# Patient Record
Sex: FEMALE | Race: WHITE | URBAN - METROPOLITAN AREA
[De-identification: names, ages, dates, MRNs, and addresses within clinical notes are randomized per-mention and may not be internally consistent; named-entity substitution may affect disease eponyms.]

---
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
## 2025-06-19 ENCOUNTER — HOSPITAL ENCOUNTER (EMERGENCY)
Facility: CLINIC | Age: 25
Discharge: HOME | End: 2025-06-20
Attending: EMERGENCY MEDICINE | Admitting: EMERGENCY MEDICINE
Payer: COMMERCIAL

## 2025-06-19 VITALS
WEIGHT: 158.07 LBS | HEIGHT: 64 IN | DIASTOLIC BLOOD PRESSURE: 71 MMHG | TEMPERATURE: 97.9 F | SYSTOLIC BLOOD PRESSURE: 117 MMHG | OXYGEN SATURATION: 99 % | BODY MASS INDEX: 26.99 KG/M2 | HEART RATE: 69 BPM | RESPIRATION RATE: 20 BRPM

## 2025-06-19 DIAGNOSIS — H60.332 ACUTE SWIMMER'S EAR OF LEFT SIDE: ICD-10-CM

## 2025-06-19 PROCEDURE — 99284 EMERGENCY DEPT VISIT MOD MDM: CPT

## 2025-06-19 ASSESSMENT — COLUMBIA-SUICIDE SEVERITY RATING SCALE - C-SSRS
6. HAVE YOU EVER DONE ANYTHING, STARTED TO DO ANYTHING, OR PREPARED TO DO ANYTHING TO END YOUR LIFE?: NO
2. HAVE YOU ACTUALLY HAD ANY THOUGHTS OF KILLING YOURSELF IN THE PAST MONTH?: NO
1. IN THE PAST MONTH, HAVE YOU WISHED YOU WERE DEAD OR WISHED YOU COULD GO TO SLEEP AND NOT WAKE UP?: NO

## 2025-06-20 RX ORDER — OFLOXACIN 3 MG/ML
5 SOLUTION AURICULAR (OTIC) 2 TIMES DAILY
Qty: 5 ML | Refills: 0 | Status: SHIPPED | OUTPATIENT
Start: 2025-06-20 | End: 2025-06-27

## 2025-06-20 NOTE — ED PROVIDER NOTES
"  Emergency Department Note      History of Present Illness     Chief Complaint   Otalgia    HPI   Elyse Charlette Alba is a 24 year old female who presents with left ear pain for approximately two weeks that worsened 3 days ago. Patient reports she had a blackhead in her ear she picked off 2 weeks ago. Three days ago she woke up from sleep with sharp shooting pain in her left ear. She reports finding blood on her pillow and now has pain when opening her jaw. She denies fever, chills, recent cough, cold or illness.     Independent Historian   None    Review of External Notes   Care everywhere reviewed and epic updated    Past Medical History     Medical History and Problem List   Past Medical History:   Diagnosis Date    Hypothyroidism      Medications   None     Surgical History   Past Surgical History:   Procedure Laterality Date    NO HISTORY OF SURGERY       Physical Exam     Patient Vitals for the past 24 hrs:   BP Temp Temp src Pulse Resp SpO2 Height Weight   06/19/25 2302 117/71 97.9  F (36.6  C) Temporal 69 20 99 % 1.626 m (5' 4\") 71.7 kg (158 lb 1.1 oz)     Physical Exam  Nursing note and vitals reviewed.  Constitutional: Cooperative.  Sitting up comfortably  HENT:   Mouth/Throat: Mucous membranes are normal.  Normal jaw opening and occlusion  Left ear: No mastoid tenderness.  External auditory canal is diffusely edematous and tender.  Unable to fully visualize the TM due to occlusion.  Pulmonary/Chest: Effort normal.   Neurological: Alert.   Skin: Skin is warm and dry.   Psychiatric: Normal mood and affect.     Diagnostics     Lab Results   Labs Ordered and Resulted from Time of ED Arrival to Time of ED Departure - No data to display    ED Course      Medications Administered   Medications - No data to display    Discussion of Management   None    ED Course   ED Course as of 06/20/25 0007   Fri Jun 20, 2025   0000 I obtained history and examined the patient as noted above.     0005 I applied an ear wick on " the patient and explained discharge instructions. She is in agreement with plan.     Additional Documentation  None    Medical Decision Making / Diagnosis     VERONICA Alba is a 24 year old female who presents for evaluation of otalgia on the left side.  The patient has an exam consistent with otitis externa.  Differential considered in this patient with otalgia included mastoiditis, meningitis, perforation, cerumen impaction, mass, dental abscess, or peritonsillar abscess, referred pain, cholesteatoma, otitis externa, etc.  Drops for the externa are noted below.  Ear wick placed in the ER.  Return if increasing pain, fever, decrease in hearing or ear discharge.  Follow-up with primary physician in 7-10 days, if symptoms persist and ENT consultation may be needed as outpatient.    Disposition   The patient was discharged.     Diagnosis     ICD-10-CM    1. Acute swimmer's ear of left side  H60.332            Discharge Medications   New Prescriptions    AMOXICILLIN-CLAVULANATE (AUGMENTIN) 875-125 MG TABLET    Take 1 tablet by mouth 2 times daily for 7 days.    OFLOXACIN (FLOXIN) 0.3 % OTIC SOLUTION    Place 5 drops Into the left ear 2 times daily for 7 days.       Scribe Disclosure:  I, Cathie Desai, am serving as a scribe at 12:01 AM on 6/20/2025 to document services personally performed by Shaggy Blanton MD based on my observations and the provider's statements to me.        Shaggy Blanton MD  06/20/25 0045